# Patient Record
Sex: MALE | HISPANIC OR LATINO | Employment: OTHER | URBAN - METROPOLITAN AREA
[De-identification: names, ages, dates, MRNs, and addresses within clinical notes are randomized per-mention and may not be internally consistent; named-entity substitution may affect disease eponyms.]

---

## 2019-11-25 ENCOUNTER — OFFICE VISIT (OUTPATIENT)
Dept: INTERNAL MEDICINE | Facility: IMAGING CENTER | Age: 69
End: 2019-11-25
Payer: MEDICARE

## 2019-11-25 VITALS
TEMPERATURE: 98.2 F | DIASTOLIC BLOOD PRESSURE: 68 MMHG | HEART RATE: 61 BPM | OXYGEN SATURATION: 98 % | SYSTOLIC BLOOD PRESSURE: 138 MMHG | RESPIRATION RATE: 14 BRPM

## 2019-11-25 DIAGNOSIS — J40 BRONCHITIS: ICD-10-CM

## 2019-11-25 PROCEDURE — 99214 OFFICE O/P EST MOD 30 MIN: CPT | Performed by: FAMILY MEDICINE

## 2019-11-25 RX ORDER — TAMSULOSIN HYDROCHLORIDE 0.4 MG/1
0.4 CAPSULE ORAL
COMMUNITY
End: 2021-03-01

## 2019-11-25 RX ORDER — FINASTERIDE 5 MG/1
5 TABLET, FILM COATED ORAL DAILY
COMMUNITY

## 2019-11-25 RX ORDER — AMOXICILLIN AND CLAVULANATE POTASSIUM 875; 125 MG/1; MG/1
1 TABLET, FILM COATED ORAL 2 TIMES DAILY
Qty: 20 TAB | Refills: 0 | Status: SHIPPED | OUTPATIENT
Start: 2019-11-25 | End: 2020-03-05

## 2019-11-25 RX ORDER — DOXAZOSIN 2 MG/1
4 TABLET ORAL
COMMUNITY

## 2019-11-26 NOTE — PROGRESS NOTES
Chief Complaint   Patient presents with   • Cough     wheezing at hs       HISTORY OF PRESENT ILLNESS: Patient is a 69 y.o. male established patient who presents today complaining o 1 week respiratory symptoms.  He has had mild upper respiratory symptoms with runny nose, slight sore throat.  The past 5 days he has had a cough, chest congestion and some wheezing at night.  No fevers or chills.  No shortness of breath.  He has been drinking fluids well and his appetite is normal.  He is a type II diabetic.  States his sugars have been in good control with morning fasting sugars around 120-140.  He has taken some NyQuil at night a couple of nights.  Cough is been mostly nonproductive.  Nasal drainage has been yellowish-green.      Patient Active Problem List    Diagnosis Date Noted   • Testosterone deficiency 09/22/2015   • Vitamin B deficiency 09/22/2015   • Chronic fatigue 09/22/2015   • Neurogenic bladder 08/22/2015   • Frequent UTI 08/22/2015   • Cervical radiculopathy 08/12/2015   • DDD (degenerative disc disease), cervical 08/12/2015   • Chronic neck pain 08/12/2015   • Muscle spasms of neck 08/12/2015   • Thoracic spondylosis 08/12/2015   • DDD (degenerative disc disease), thoracic 08/12/2015   • Thoracic radiculopathy 08/12/2015   • Thoracic compression fracture (HCC) 08/12/2015   • Chronic mid back pain 08/12/2015   • Muscle spasm of back 08/12/2015   • Normal coronary angiogram 07/16/2015   • Abnormal nuclear stress test 06/16/2015   • Liver hemangioma 06/16/2015   • Diabetes mellitus, type 2 (HCC) 09/02/2014   • Hypertension 09/02/2014   • Spinal cord contusion 08/24/2012   • Myelitis uncertain etiology 08/24/2012   • Dysautonomia (HCC) 08/24/2012   • Abnormal electrocardiogram jpossibly due to LVH 09/23/2009   • Cervical spondylosis 09/23/2009   • Dysmetabolic syndrome X 09/23/2009   • Essential hypertension 09/23/2009   • Paraplegia (HCC) 09/23/2009     Current Outpatient Medications on File Prior to Visit    Medication Sig Dispense Refill   • doxazosin (CARDURA) 2 MG Tab Take 2 mg by mouth every bedtime.     • finasteride (PROSCAR) 5 MG Tab Take 5 mg by mouth every day.     • tamsulosin (FLOMAX) 0.4 MG capsule Take 0.4 mg by mouth ONE-HALF HOUR AFTER BREAKFAST.     • FERROUS SULFATE PO Take  by mouth every day.     • Metformin HCl 1000 MG (MOD) TB24 Take 1 Tab by mouth 2 Times a Day.     • amlodipine (NORVASC) 2.5 MG TABS Take 2.5 mg by mouth 2 Times a Day.     • aspirin EC (ECOTRIN) 81 MG TBEC Take 81 mg by mouth every day.     • cyanocobalamin (VITAMIN B12) 1000 MCG TABS Take 1,000 mcg by mouth every day.     • Calcium Carbonate-Vitamin D (CALCIUM 500 + D PO) Take 1 Tab by mouth 2 Times a Day.     • carvedilol (COREG) 25 MG TABS Take 25 mg by mouth 2 times a day, with meals.     • hydrocortisone 2.5 % CREA Apply to affected areas bid as needed. 60 g 1   • nystatin (MYCOSTATIN) powder Apply tid prn 30 g 1   • insulin aspart protamine-insulin aspart (NOVOLOG MIX 70/30) (70-30) 100 UNIT/ML injection Inject 60-64 Units as instructed 2 Times a Day.     • Thiamine HCl (VITAMIN B-1) 250 MG TABS Take 1 Tab by mouth every day.     • Specialty Vitamins Products (PROSTATE) TABS Take 1 Tab by mouth 2 Times a Day.     • Non Formulary Request Nopal blood sugar 2 capsules before meals      • insulin glargine (LANTUS) 100 UNIT/ML SOLN Inject 96 Units as instructed every evening.       No current facility-administered medications on file prior to visit.          Past medical, surgical, family, and social history is reviewed and updated in Epic chart by me today.   Medications and allergies reviewed and updated in Epic chart by me today.     REVIEW OF SYSTEMS:  GENERAL: mild fatigue, no weight loss.  HEENT:  Ears--no earache, no change in hearing, no dizziness, no tinnitus.                 Eyes--no blurred vision, no discharge or pain                 Throat--mild sore throat, no dysphagia, no hoarseness  CV:  No chest  pain,dyspnea,palpitations or edema.  RESP: Cough and wheezing at night as above.  No hemoptysis.  No shortness of breath.  GI: No dysphagia, heartburn,abdominal pain, nausea, vomiting, diarrhea or constipation.       No melena, jaundice, bleeding, incontinence or change in bowel habits.  :  No dysuria, polyuria, hematuria, incontinence, or nocturia.  MS:  No joint swelling, myalgias, or arthralgias.  NEURO:  No seizures, syncope, paralysis, tremor, he does have weakness and is wheelchair-bound.  SKIN: No new or concerning skin lesions or changes.   PSYCH: Mood fine.    Vitals:    11/25/19 1624   BP: 138/68   Pulse: 61   Resp: 14   Temp: 36.8 °C (98.2 °F)   SpO2: 98%     Physical Exam:  GENERAL; obese male in wheelchair., No acute distress.  HEENT:  PERRLA, EOMI, no conjuctival injection, no icterus.                 TM's normal bilat.                 Nasal mucosa erythematous and edematous.                 Pharynx injected. No exudate.  NECK: Supple with no adenopathy or thyromegaly.  LUNGS: Clear with no rales, rhonchi, or wheezes.  COR: RR with no murmur, gallop or rub.  EXT: No edema.        Assessment/Plan:  1. Bronchitis.  Augmentin 875 twice daily, Mucinex twice daily.  Increase fluids, rest.  Call for any worsening of his symptoms.

## 2020-03-05 ENCOUNTER — OFFICE VISIT (OUTPATIENT)
Dept: INTERNAL MEDICINE | Facility: IMAGING CENTER | Age: 70
End: 2020-03-05
Payer: MEDICARE

## 2020-03-05 ENCOUNTER — TELEPHONE (OUTPATIENT)
Dept: INTERNAL MEDICINE | Facility: IMAGING CENTER | Age: 70
End: 2020-03-05

## 2020-03-05 VITALS
TEMPERATURE: 98 F | HEART RATE: 62 BPM | RESPIRATION RATE: 14 BRPM | SYSTOLIC BLOOD PRESSURE: 116 MMHG | OXYGEN SATURATION: 97 % | DIASTOLIC BLOOD PRESSURE: 70 MMHG

## 2020-03-05 DIAGNOSIS — J40 BRONCHITIS: ICD-10-CM

## 2020-03-05 PROBLEM — I50.32 CHRONIC DIASTOLIC CONGESTIVE HEART FAILURE (HCC): Status: ACTIVE | Noted: 2020-03-05

## 2020-03-05 PROBLEM — G47.33 OSA (OBSTRUCTIVE SLEEP APNEA): Status: ACTIVE | Noted: 2020-03-05

## 2020-03-05 PROCEDURE — 99214 OFFICE O/P EST MOD 30 MIN: CPT | Performed by: FAMILY MEDICINE

## 2020-03-05 RX ORDER — TRIAMTERENE AND HYDROCHLOROTHIAZIDE 37.5; 25 MG/1; MG/1
1 CAPSULE ORAL EVERY MORNING
COMMUNITY

## 2020-03-05 RX ORDER — BENZONATATE 100 MG/1
100 CAPSULE ORAL 3 TIMES DAILY PRN
Qty: 30 CAP | Refills: 0 | Status: SHIPPED | OUTPATIENT
Start: 2020-03-05

## 2020-03-05 RX ORDER — FUROSEMIDE 20 MG/1
20 TABLET ORAL
COMMUNITY

## 2020-03-05 RX ORDER — LOSARTAN POTASSIUM 25 MG/1
TABLET ORAL
COMMUNITY
Start: 2020-02-13

## 2020-03-05 RX ORDER — AMOXICILLIN AND CLAVULANATE POTASSIUM 875; 125 MG/1; MG/1
1 TABLET, FILM COATED ORAL 2 TIMES DAILY
Qty: 14 TAB | Refills: 0 | Status: SHIPPED | OUTPATIENT
Start: 2020-03-05 | End: 2020-03-31

## 2020-03-05 RX ORDER — AZITHROMYCIN 250 MG/1
TABLET, FILM COATED ORAL
Qty: 6 TAB | Refills: 0 | Status: SHIPPED | OUTPATIENT
Start: 2020-03-05 | End: 2020-03-31

## 2020-03-06 NOTE — PROGRESS NOTES
Chief Complaint   Patient presents with   • Cough     And chest congestion for the past 2 weeks.       HISTORY OF PRESENT ILLNESS: Patient is a 70 y.o. male established patient who presents today with his wife complaining of cough and chest congestion for the past 2 weeks.  He was hospitalized in December for pneumonia.  He did improve.  2 weeks ago he started with some head congestion and a sore throat and now it has moved to his chest.  He has a cough and chest congestion.  Mild shortness of breath when he lays flat.  He has been sleeping at an incline.  No fevers or chills.  He is a functional quadriplegic and is at high risk for aspiration.    He is drinking fluids well.  His appetite is good.  He has had no recent travel outside of [place].      Patient Active Problem List    Diagnosis Date Noted   • Chronic diastolic congestive heart failure (HCC) 03/05/2020   • IMTIAZ (obstructive sleep apnea) 03/05/2020   • Testosterone deficiency 09/22/2015   • Vitamin B deficiency 09/22/2015   • Chronic fatigue 09/22/2015   • Neurogenic bladder 08/22/2015   • Frequent UTI 08/22/2015   • Cervical radiculopathy 08/12/2015   • DDD (degenerative disc disease), cervical 08/12/2015   • Chronic neck pain 08/12/2015   • Muscle spasms of neck 08/12/2015   • Thoracic spondylosis 08/12/2015   • DDD (degenerative disc disease), thoracic 08/12/2015   • Thoracic radiculopathy 08/12/2015   • Thoracic compression fracture (HCC) 08/12/2015   • Chronic mid back pain 08/12/2015   • Muscle spasm of back 08/12/2015   • Normal coronary angiogram 07/16/2015   • Abnormal nuclear stress test 06/16/2015   • Liver hemangioma 06/16/2015   • Diabetes mellitus, type 2 (HCC) 09/02/2014   • Hypertension 09/02/2014   • Spinal cord contusion 08/24/2012   • Myelitis uncertain etiology 08/24/2012   • Dysautonomia (HCC) 08/24/2012   • Abnormal electrocardiogram jpossibly due to LVH 09/23/2009   • Cervical spondylosis 09/23/2009   • Dysmetabolic syndrome X  09/23/2009   • Essential hypertension 09/23/2009   • Paraplegia (HCC) 09/23/2009     Current Outpatient Medications on File Prior to Visit   Medication Sig Dispense Refill   • losartan (COZAAR) 25 MG Tab      • triamterene/hctz (MAXZIDE-25/DYAZIDE) 37.5-25 MG Cap Take 1 Cap by mouth every morning.     • furosemide (LASIX) 20 MG Tab Take 20 mg by mouth Every Sunday and Wednesday.     • doxazosin (CARDURA) 2 MG Tab Take 2 mg by mouth every bedtime.     • finasteride (PROSCAR) 5 MG Tab Take 5 mg by mouth every day.     • tamsulosin (FLOMAX) 0.4 MG capsule Take 0.4 mg by mouth ONE-HALF HOUR AFTER BREAKFAST.     • nystatin (MYCOSTATIN) powder Apply tid prn 30 g 1   • Metformin HCl 1000 MG (MOD) TB24 Take 1 Tab by mouth 2 Times a Day.     • aspirin EC (ECOTRIN) 81 MG TBEC Take 81 mg by mouth every day.     • cyanocobalamin (VITAMIN B12) 1000 MCG TABS Take 1,000 mcg by mouth every day.     • Calcium Carbonate-Vitamin D (CALCIUM 500 + D PO) Take 1 Tab by mouth 2 Times a Day.     • insulin aspart protamine-insulin aspart (NOVOLOG MIX 70/30) (70-30) 100 UNIT/ML injection Inject 60-64 Units as instructed 2 Times a Day.     • Thiamine HCl (VITAMIN B-1) 250 MG TABS Take 1 Tab by mouth every day.     • Specialty Vitamins Products (PROSTATE) TABS Take 1 Tab by mouth 2 Times a Day.     • carvedilol (COREG) 25 MG TABS Take 25 mg by mouth 2 times a day, with meals.     • insulin glargine (LANTUS) 100 UNIT/ML SOLN Inject 96 Units as instructed every evening.     • hydrocortisone 2.5 % CREA Apply to affected areas bid as needed. 60 g 1   • Non Formulary Request Nopal blood sugar 2 capsules before meals        No current facility-administered medications on file prior to visit.          Past medical, surgical, family, and social history is reviewed and updated in Epic chart by me today.   Medications and allergies reviewed and updated in Epic chart by me today.     REVIEW OF SYSTEMS:  GENERAL: Increased fatigue, no weight loss.  HEENT:   Ears--no earache, no change in hearing, no dizziness, no tinnitus.                 Eyes--no blurred vision, no discharge or pain                 Throat--positive for sore throat, no dysphagia, no hoarseness  CV:  No chest pain,dyspnea,palpitations or edema.  RESP: Cough, no hemoptysis no wheezing.  GI: No dysphagia, heartburn,abdominal pain, nausea, vomiting, diarrhea or constipation.       No melena, jaundice, bleeding, incontinence or change in bowel habits.  :  No dysuria, polyuria, hematuria, incontinence, or nocturia.  MS:  No joint swelling, myalgias, or arthralgias.  NEURO:  No seizures, syncope, paralysis, tremor, or weakness.  SKIN: No new or concerning skin lesions or changes.   PSYCH: Mood fine.    Vitals:    03/05/20 1400   BP: 116/70   Pulse: 62   Resp: 14   Temp: 36.7 °C (98 °F)   TempSrc: Temporal   SpO2: 97%     Physical Exam:  GENERAL;  WD/WN, No acute distress.  HEENT:  PERRLA, EOMI, no conjuctival injection, no icterus.                 TM's normal bilat.                 Nasal mucosa erythematous and edematous.                 Pharynx injected. No exudate.  NECK: Supple with no adenopathy or thyromegaly.  LUNGS: Decreased breath sounds.  No rales rhonchi or wheezes.  COR: RR with no murmur, gallop or rub.  EXT: No edema.      Assessment/Plan:  1. Bronchitis     I am suspicious he may even have an early pneumonia.  He is at high risk for aspiration.  Will treat with Augmentin and Zithromax.  He may use Tessalon Perles at night for cough.  Continue to sleep at an incline, increase fluids and rest.  Encouraged to call for any worsening of his symptoms.

## 2020-03-31 ENCOUNTER — TELEPHONE (OUTPATIENT)
Dept: INTERNAL MEDICINE | Facility: IMAGING CENTER | Age: 70
End: 2020-03-31

## 2020-03-31 DIAGNOSIS — N39.0 LOWER URINARY TRACT INFECTIOUS DISEASE: ICD-10-CM

## 2020-03-31 RX ORDER — CIPROFLOXACIN 500 MG/1
500 TABLET, FILM COATED ORAL 2 TIMES DAILY
Qty: 14 TAB | Refills: 0 | Status: SHIPPED | OUTPATIENT
Start: 2020-03-31 | End: 2021-03-01

## 2020-03-31 NOTE — TELEPHONE ENCOUNTER
Adrienne calls today, reporting Everardo is having UTI symptoms, burning with urination, cloudy red urine.  Requesting an antibiotic.

## 2021-01-15 DIAGNOSIS — Z23 NEED FOR VACCINATION: ICD-10-CM

## 2021-03-01 ENCOUNTER — OFFICE VISIT (OUTPATIENT)
Dept: INTERNAL MEDICINE | Facility: IMAGING CENTER | Age: 71
End: 2021-03-01
Payer: MEDICARE

## 2021-03-01 VITALS
DIASTOLIC BLOOD PRESSURE: 72 MMHG | WEIGHT: 215 LBS | BODY MASS INDEX: 35.78 KG/M2 | HEART RATE: 61 BPM | RESPIRATION RATE: 14 BRPM | SYSTOLIC BLOOD PRESSURE: 128 MMHG | TEMPERATURE: 97.5 F | OXYGEN SATURATION: 97 %

## 2021-03-01 DIAGNOSIS — N31.9 NEUROGENIC BLADDER: ICD-10-CM

## 2021-03-01 DIAGNOSIS — Z79.4 TYPE 2 DIABETES MELLITUS WITHOUT COMPLICATION, WITH LONG-TERM CURRENT USE OF INSULIN (HCC): ICD-10-CM

## 2021-03-01 DIAGNOSIS — G82.50 TETRAPLEGIA (HCC): ICD-10-CM

## 2021-03-01 DIAGNOSIS — E11.9 TYPE 2 DIABETES MELLITUS WITHOUT COMPLICATION, WITH LONG-TERM CURRENT USE OF INSULIN (HCC): ICD-10-CM

## 2021-03-01 DIAGNOSIS — I50.32 CHRONIC DIASTOLIC CONGESTIVE HEART FAILURE (HCC): ICD-10-CM

## 2021-03-01 DIAGNOSIS — I10 ESSENTIAL HYPERTENSION: ICD-10-CM

## 2021-03-01 PROCEDURE — 99215 OFFICE O/P EST HI 40 MIN: CPT | Performed by: FAMILY MEDICINE

## 2021-03-01 RX ORDER — DOCUSATE SODIUM AND SENNOSIDES 8.6; 5 MG/1; MG/1
TABLET ORAL
COMMUNITY
Start: 2020-12-13

## 2021-03-01 RX ORDER — CARBOXYMETHYLCELLULOSE SODIUM 5 MG/ML
SOLUTION/ DROPS OPHTHALMIC
COMMUNITY
Start: 2021-01-21

## 2021-03-01 RX ORDER — ATORVASTATIN CALCIUM 10 MG/1
TABLET, FILM COATED ORAL
COMMUNITY
Start: 2021-01-26

## 2021-03-01 ASSESSMENT — PATIENT HEALTH QUESTIONNAIRE - PHQ9: CLINICAL INTERPRETATION OF PHQ2 SCORE: 0

## 2021-06-01 PROBLEM — G82.50 TETRAPLEGIA (HCC): Status: ACTIVE | Noted: 2021-06-01

## 2021-06-01 NOTE — PROGRESS NOTES
Chief Complaint   Patient presents with   • Establish Care       Subjective:     HPI:   Everardo Dean Sr. is a 71 y.o. male here to establish care and to discuss the evaluation and management of:       Problem   Tetraplegia (Hcc)    Spinal cord injury after a vaccination 1990 with subsequent tetraplegia       Chronic Diastolic Congestive Heart Failure (Hcc)   Neurogenic Bladder   Diabetes Mellitus, Type 2 (Hcc)    Well-controlled, fasting blood sugar this morning was 96  Normal A1c done at [facility] in February,     Essential Hypertension             Objective:     /72   Pulse 61   Temp 36.4 °C (97.5 °F) (Temporal)   Resp 14   Wt 97.5 kg (215 lb)   SpO2 97%  Body mass index is 35.78 kg/m².    Physical Exam:  Physical Exam  Constitutional: Well-developed and well-nourished. Not diaphoretic. No distress.  In wheelchair  Skin: Skin is warm and dry. No rash noted.  Head: Atraumatic without lesions.  Eyes: Conjunctivae and extraocular motions are normal.   Ears:  External ears unremarkable.    Nose: Nares patent. Mucosa without edema or erythema. No discharge. No facial tenderness.     Neck: Supple, No thyromegaly present. No JVD  Cardiovascular: Regular rate and rhythm.   Chest: Effort normal. Clear to auscultation throughout. No adventitious sounds.   Abdomen:  without distention.  .  Extremities: No cyanosis, clubbing, erythema, nor edema.   Neurological: Alert and oriented x 3.    Psychiatric:  Behavior, mood, and affect are appropriate     Assessment and Plan:     The following treatment plan was discussed/researched:  Tetraplegia (HCC)  Managing well with the help of his wife Sayra who does all of his care    Chronic diastolic congestive heart failure (HCC)  Euvolemic continue management per [facility] cardiologist    Neurogenic bladder  Managed by urology    Essential hypertension  Stable on present regimen    Diabetes mellitus, type 2 (HCC)  Continue with present regimen                                                                                                                                                                                  Any change or worsening of signs or symptoms, patient encouraged to follow-up or report to emergency room for further evaluation. Patient verbalizes understanding and agrees.    Follow-Up: No follow-ups on file.      PLEASE NOTE: This dictation was created using voice recognition software. I have made every reasonable attempt to correct obvious errors, but I expect that there are errors of grammar and possibly content that I did not discover before finalizing the note.      My total time spent caring for the patient on the day of the encounter was  greater than 40 minutes.   This includes obtaining history, reviewing chart, physical exam, patient education, reviewing outside records, placing orders, interpreting tests and coordinating care.

## 2021-12-06 ENCOUNTER — TELEPHONE (OUTPATIENT)
Dept: INTERNAL MEDICINE | Facility: IMAGING CENTER | Age: 71
End: 2021-12-06

## 2021-12-06 RX ORDER — AZITHROMYCIN 250 MG/1
TABLET, FILM COATED ORAL
Qty: 6 TABLET | Refills: 0 | Status: SHIPPED | OUTPATIENT
Start: 2021-12-06

## 2021-12-06 NOTE — TELEPHONE ENCOUNTER
Everardo started a cold about a week ago. He now has green mucus and a mild fever. They did a home covid test and it was negative.  Can you send in a [id number]?

## 2022-07-28 ENCOUNTER — TELEPHONE (OUTPATIENT)
Dept: HEALTH INFORMATION MANAGEMENT | Facility: OTHER | Age: 72
End: 2022-07-28

## 2022-07-28 NOTE — TELEPHONE ENCOUNTER
Outcome: DECLINED    Please transfer to Patient Outreach Team at [phone] when patient returns call.    PCP NON[facility], GOES TO [facility].    Attempt # 1

## 2023-06-12 ENCOUNTER — APPOINTMENT (OUTPATIENT)
Dept: INTERNAL MEDICINE | Facility: IMAGING CENTER | Age: 73
End: 2023-06-12
Payer: COMMERCIAL

## 2023-06-19 ENCOUNTER — OFFICE VISIT (OUTPATIENT)
Dept: INTERNAL MEDICINE | Facility: IMAGING CENTER | Age: 73
End: 2023-06-19
Payer: COMMERCIAL

## 2023-06-19 ENCOUNTER — TELEPHONE (OUTPATIENT)
Dept: INTERNAL MEDICINE | Facility: IMAGING CENTER | Age: 73
End: 2023-06-19

## 2023-06-19 VITALS
RESPIRATION RATE: 16 BRPM | DIASTOLIC BLOOD PRESSURE: 80 MMHG | WEIGHT: 214 LBS | HEART RATE: 58 BPM | OXYGEN SATURATION: 96 % | SYSTOLIC BLOOD PRESSURE: 124 MMHG | TEMPERATURE: 97.7 F | BODY MASS INDEX: 35.61 KG/M2

## 2023-06-19 DIAGNOSIS — R19.7 DIARRHEA, UNSPECIFIED TYPE: ICD-10-CM

## 2023-06-19 DIAGNOSIS — I10 ESSENTIAL HYPERTENSION: ICD-10-CM

## 2023-06-19 DIAGNOSIS — G82.20 PARAPLEGIA (HCC): ICD-10-CM

## 2023-06-19 DIAGNOSIS — G47.33 OSA (OBSTRUCTIVE SLEEP APNEA): ICD-10-CM

## 2023-06-19 DIAGNOSIS — Z02.9 ADMINISTRATIVE ENCOUNTER: ICD-10-CM

## 2023-06-19 PROBLEM — E78.49 OTHER HYPERLIPIDEMIA: Status: ACTIVE | Noted: 2022-03-18

## 2023-06-19 PROCEDURE — 3079F DIAST BP 80-89 MM HG: CPT | Performed by: FAMILY MEDICINE

## 2023-06-19 PROCEDURE — 3074F SYST BP LT 130 MM HG: CPT | Performed by: FAMILY MEDICINE

## 2023-06-19 PROCEDURE — 99215 OFFICE O/P EST HI 40 MIN: CPT | Performed by: FAMILY MEDICINE

## 2023-06-19 ASSESSMENT — PATIENT HEALTH QUESTIONNAIRE - PHQ9: CLINICAL INTERPRETATION OF PHQ2 SCORE: 0

## 2023-06-19 ASSESSMENT — PAIN SCALES - GENERAL: PAINLEVEL: NO PAIN

## 2023-06-19 NOTE — TELEPHONE ENCOUNTER
LVM for pt to verify ventilator company had correct fax number as we have not received any faxes regarding ventilator.

## 2023-06-20 NOTE — PROGRESS NOTES
Chief Complaint   Patient presents with    Other     Questions in regards to ventilator     Diarrhea     [id number], any food he eats        Subjective:     HPI:   Everardo Dean Sr. is a 73 y.o. male with paraplegia, IMTIAZ here  to discuss the evaluation and management of:       Problem   Other Hyperlipidemia      1.  He has been using trilogy ventilator to help him when he is exercising  He will need to continue to get this    2.  Diarrhea x6 months  He has noticed intolerance to lactose  He is overdue for a colonoscopy  Sometimes he has gas and bloating and indigestion         Objective:     /80 (BP Location: Left arm, Patient Position: Sitting, BP Cuff Size: Adult)   Pulse (!) 58   Temp 36.5 °C (97.7 °F) (Temporal)   Resp 16   Wt 97.1 kg (214 lb)   SpO2 96%  Body mass index is 35.61 kg/m².    Physical Exam:  Physical Exam  Constitutional: Well-developed and well-nourished. Not diaphoretic. No distress.  Sitting in wheelchair/paraplegia  Skin: Skin is warm and dry. No rash noted.  Head: Atraumatic without lesions.  Eyes: Conjunctivae and extraocular motions are normal.   Ears:  External ears unremarkable.    Nose: Nares patent. Mucosa without edema or erythema. No discharge. No facial tenderness.     Neck: Supple,       Chest: Effort normal.       Neurological: Alert and oriented x 3.    Psychiatric:  Behavior, mood, and affect are appropriate     Assessment and Plan:     The following treatment plan was discussed/researched:  No problem-specific Assessment & Plan notes found for this encounter.    1. Essential hypertension--stable continue current regimen      2. IMTIAZ (obstructive sleep apnea)-[facility]    3. Paraplegia (HCC)-continue follow-up [facility]    4. Diarrhea, unspecified type-continue with avoidance of lactose  May use one half Imodium daily  Consider referral to colonoscopy-discussed the possibility it could be colitis  May use Pepcid for indigestion and other GERD  symptoms    5. Administrative-forms for this letter will be filled out and sent  Other orders  - docusate sodium (COLACE) 50 MG Cap; Take 1 Capsule by mouth. (Patient not taking: Reported on 6/19/2023)  - Cholecalciferol 2000 UNIT Tab; take 1 by Oral route  every day                                                                                                                                                                                            Any change or worsening of signs or symptoms, patient encouraged to follow-up or report to emergency room for further evaluation. Patient verbalizes understanding and agrees.    Follow-Up: No follow-ups on file.      PLEASE NOTE: This dictation was created using voice recognition software. I have made every reasonable attempt to correct obvious errors, but I expect that there are errors of grammar and possibly content that I did not discover before finalizing the note.      My total time spent caring for the patient on the day of the encounter was  greater than 40 minutes.   This includes obtaining history, reviewing chart, physical exam, patient education, reviewing outside records, placing orders, interpreting tests and coordinating care.

## 2024-01-04 ENCOUNTER — NON-PROVIDER VISIT (OUTPATIENT)
Dept: INTERNAL MEDICINE | Facility: IMAGING CENTER | Age: 74
End: 2024-01-04
Payer: COMMERCIAL

## 2024-01-04 DIAGNOSIS — R05.1 ACUTE COUGH: ICD-10-CM

## 2024-01-04 LAB
FLUAV RNA SPEC QL NAA+PROBE: NEGATIVE
FLUBV RNA SPEC QL NAA+PROBE: NEGATIVE
RSV RNA SPEC QL NAA+PROBE: NEGATIVE
SARS-COV-2 RNA RESP QL NAA+PROBE: POSITIVE

## 2024-01-04 PROCEDURE — 0241U POCT CEPHEID COV-2, FLU A/B, RSV - PCR: CPT | Performed by: FAMILY MEDICINE

## 2024-04-11 NOTE — ASSESSMENT & PLAN NOTE
----- Message from Sam Dickens MD sent at 4/1/2024  2:27 PM EDT -----  Essentially normal Holter monitor with 0.1% PACs which were asymptomatic.   Stable on present regimen

## 2025-08-19 ENCOUNTER — OFFICE VISIT (OUTPATIENT)
Dept: INTERNAL MEDICINE | Facility: IMAGING CENTER | Age: 75
End: 2025-08-19
Payer: COMMERCIAL

## 2025-08-19 VITALS
RESPIRATION RATE: 14 BRPM | DIASTOLIC BLOOD PRESSURE: 60 MMHG | OXYGEN SATURATION: 97 % | HEART RATE: 53 BPM | SYSTOLIC BLOOD PRESSURE: 100 MMHG | TEMPERATURE: 97.9 F

## 2025-08-19 DIAGNOSIS — Z79.4 TYPE 2 DIABETES MELLITUS WITHOUT COMPLICATION, WITH LONG-TERM CURRENT USE OF INSULIN (HCC): Primary | ICD-10-CM

## 2025-08-19 DIAGNOSIS — G82.20 PARAPLEGIA (HCC): Chronic | ICD-10-CM

## 2025-08-19 DIAGNOSIS — S90.121A TRAUMATIC ECCHYMOSIS OF TOE OF RIGHT FOOT, INITIAL ENCOUNTER: ICD-10-CM

## 2025-08-19 DIAGNOSIS — E11.9 TYPE 2 DIABETES MELLITUS WITHOUT COMPLICATION, WITH LONG-TERM CURRENT USE OF INSULIN (HCC): Primary | ICD-10-CM

## 2025-08-19 PROCEDURE — 3078F DIAST BP <80 MM HG: CPT | Performed by: FAMILY MEDICINE

## 2025-08-19 PROCEDURE — 99214 OFFICE O/P EST MOD 30 MIN: CPT | Performed by: FAMILY MEDICINE

## 2025-08-19 PROCEDURE — 3074F SYST BP LT 130 MM HG: CPT | Performed by: FAMILY MEDICINE

## 2025-08-19 RX ORDER — DOXYCYCLINE HYCLATE 100 MG
100 TABLET ORAL 2 TIMES DAILY
Qty: 20 TABLET | Refills: 0 | Status: SHIPPED | OUTPATIENT
Start: 2025-08-19